# Patient Record
Sex: FEMALE
[De-identification: names, ages, dates, MRNs, and addresses within clinical notes are randomized per-mention and may not be internally consistent; named-entity substitution may affect disease eponyms.]

---

## 2021-08-09 PROBLEM — Z00.00 ENCOUNTER FOR PREVENTIVE HEALTH EXAMINATION: Status: ACTIVE | Noted: 2021-08-09

## 2021-08-19 ENCOUNTER — APPOINTMENT (OUTPATIENT)
Dept: OTOLARYNGOLOGY | Facility: CLINIC | Age: 26
End: 2021-08-19
Payer: COMMERCIAL

## 2021-08-19 VITALS
SYSTOLIC BLOOD PRESSURE: 114 MMHG | OXYGEN SATURATION: 100 % | HEART RATE: 94 BPM | HEIGHT: 62 IN | DIASTOLIC BLOOD PRESSURE: 81 MMHG | BODY MASS INDEX: 23.37 KG/M2 | TEMPERATURE: 98.2 F | WEIGHT: 127 LBS

## 2021-08-19 DIAGNOSIS — R07.0 PAIN IN THROAT: ICD-10-CM

## 2021-08-19 DIAGNOSIS — J35.1 HYPERTROPHY OF TONSILS: ICD-10-CM

## 2021-08-19 DIAGNOSIS — Z87.09 PERSONAL HISTORY OF OTHER DISEASES OF THE RESPIRATORY SYSTEM: ICD-10-CM

## 2021-08-19 DIAGNOSIS — J35.8 OTHER CHRONIC DISEASES OF TONSILS AND ADENOIDS: ICD-10-CM

## 2021-08-19 PROCEDURE — 31575 DIAGNOSTIC LARYNGOSCOPY: CPT

## 2021-08-19 PROCEDURE — 99203 OFFICE O/P NEW LOW 30 MIN: CPT | Mod: 25

## 2021-09-22 NOTE — CONSULT LETTER
[Dear  ___] : Dear  [unfilled], [Consult Letter:] : I had the pleasure of evaluating your patient, [unfilled]. [Please see my note below.] : Please see my note below. [Consult Closing:] : Thank you very much for allowing me to participate in the care of this patient.  If you have any questions, please do not hesitate to contact me. [Sincerely,] : Sincerely, [FreeTextEntry3] : Jaquan Clark MD, FACS\par Professor of Otolaryngology, [facility] [employer] [employer] at [facility]\par Director, [facility], Department of Otolaryngology, [facility]\par , Head & Neck Service Line, [facility]\par

## 2021-09-22 NOTE — PROCEDURE
[Image(s) Captured] : image(s) captured and filed [Topical Lidocaine] : topical lidocaine [Flexible Endoscope] : examined with the flexible endoscope [Serial Number: ___] : Serial Number: [unfilled] [de-identified] : bilateral deep cryptic tonsils with tonsil stones \par Ling tonsils

## 2021-09-22 NOTE — PHYSICAL EXAM
[Normal] : mucosa is normal [Midline] : trachea located in midline position [de-identified] : large [de-identified] :  bilateral deep cryptic tonsils with tonsil stones

## 2021-09-22 NOTE — HISTORY OF PRESENT ILLNESS
[de-identified] : 25 years old female patient with history of Persistent tonsil stones for the past 6 years.  Patient is present today in the office with bilateral deep cryptic tonsils with tonsil stones

## 2021-09-22 NOTE — REASON FOR VISIT
[Initial Evaluation] : an initial evaluation for [FreeTextEntry2] : Persistent tonsil stones for the past 6 years.  Patient states her level of severity is a level 8 out of 10 and it occurs constant.  Patient states nothing helps to improve or worsens her Persistent tonsil stones for the past 6 years.

## 2021-10-14 DIAGNOSIS — Z01.818 ENCOUNTER FOR OTHER PREPROCEDURAL EXAMINATION: ICD-10-CM

## 2021-10-21 ENCOUNTER — APPOINTMENT (OUTPATIENT)
Dept: OTOLARYNGOLOGY | Facility: AMBULATORY SURGERY CENTER | Age: 26
End: 2021-10-21